# Patient Record
(demographics unavailable — no encounter records)

---

## 2024-10-11 NOTE — LETTER BODY
[FreeTextEntry1] : Janeth Alcazar DO [address] [facility] [address], [place] [address] [phone]  Dear Dr. Alcazar,     REASON FOR VISIT: BPH    This is a 77 year-old gentleman with lower urinary tract symptoms and BPH. Patient is here today for follow-up cystoscopy and UDS. His cystoscopy today demonstrated a prostatic urethra length of 4 cm. Patient has bilobar hypertrophy obstructing the urinary outlet. His UDS confirms evidence of bladder outlet obstruction. Patient reports he has weak uroflow, frequency, and hesitancy despite taking Flomax and Proscar. He denies any hematuria or urinary incontinence. His symptoms are aggravated by hydration. He denies any alleviating factors. He denies any pain. All other review of systems are negative. He has no cancer in his family medical history. He has no previous surgical history. Past medical history, family history and social history were inquired and were noncontributory to current condition. The patient does not use tobacco or drink alcohol. Medications and allergies were reviewed. He has no known allergies to medication.    On examination, the patient is a healthy-appearing gentleman in no acute distress. He is alert and oriented follows commands. He has normal mood and affect. He is normocephalic. Neck is supple. Oral no thrush. Respirations are unlabored. His abdomen is soft and nontender. Bladder is nonpalpable. No CVA tenderness. Neurologically he is grossly intact. No peripheral edema. Skin without gross abnormality. He has normal male external genitalia. Normal meatus. Bilateral testes are descended intrascrotally and normal to palpation. On rectal examination, there is normal sphincter tone. The prostate is clinically benign without focal induration or nodularity.  His cystoscopy today demonstrated a prostatic urethra length of 4 cm. Patient has bilobar hypertrophy obstructing the urinary outlet.   His UDS confirms evidence of bladder outlet obstruction.    ASSESSMENT: BPH    I counseled the patient. His cystoscopy today demonstrated bilobar hypertrophy obstructing the urinary outlet. Urodynamics also demonstrated evidence of bladder outlet obstruction. I discussed the treatment options available to the patient. He will proceed with Greenlight laser vaporization of prostate. I counseled the patient regarding the procedure. The patient will take the necessary preparations for the procedure.  Risks and alternatives were discussed. I answered the patient's questions. The patient will follow-up as directed and will contact me with any questions or concerns. Thank you for the opportunity to participate in the care of Mr. ANTOINE. I will keep you updated on his progress.    Plan: Greenlight laser vaporization of prostate. Follow up as directed.

## 2024-10-11 NOTE — ADDENDUM
[FreeTextEntry1] : Entered by Claude Sanches, acting as scribe for Dr. Alexys Villatoro. The documentation recorded by the scribe accurately reflects the service I personally performed and the decisions made by me.

## 2024-10-11 NOTE — LETTER BODY
[FreeTextEntry1] : Janeth Alcazar DO [address] [facility] [address], [place] [address] [phone]  Dear Dr. Alcazar,     REASON FOR VISIT: BPH    This is a 77 year-old gentleman with lower urinary tract symptoms and BPH. Patient is here today for follow-up cystoscopy and UDS. His cystoscopy today demonstrated a prostatic urethra length of 4 cm. Patient has bilobar hypertrophy obstructing the urinary outlet. His UDS confirms evidence of bladder outlet obstruction. Patient reports he has weak uroflow, frequency, and hesitancy despite taking Flomax and Proscar. He denies any hematuria or urinary incontinence. His symptoms are aggravated by hydration. He denies any alleviating factors. He denies any pain. All other review of systems are negative. He has no cancer in his family medical history. He has no previous surgical history. Past medical history, family history and social history were inquired and were noncontributory to current condition. The patient does not use tobacco or drink alcohol. Medications and allergies were reviewed. He has no known allergies to medication.    On examination, the patient is a healthy-appearing gentleman in no acute distress. He is alert and oriented follows commands. He has normal mood and affect. He is normocephalic. Neck is supple. Oral no thrush. Respirations are unlabored. His abdomen is soft and nontender. Bladder is nonpalpable. No CVA tenderness. Neurologically he is grossly intact. No peripheral edema. Skin without gross abnormality. He has normal male external genitalia. Normal meatus. Bilateral testes are descended intrascrotally and normal to palpation. On rectal examination, there is normal sphincter tone. The prostate is clinically benign without focal induration or nodularity.  His cystoscopy today demonstrated a prostatic urethra length of 4 cm. Patient has bilobar hypertrophy obstructing the urinary outlet.   His UDS confirms evidence of bladder outlet obstruction.    ASSESSMENT: BPH    I counseled the patient. His cystoscopy today demonstrated bilobar hypertrophy obstructing the urinary outlet. Urodynamics also demonstrated evidence of bladder outlet obstruction. I discussed the treatment options available to the patient. He will proceed with Greenlight laser vaporization of prostate. I counseled the patient regarding the procedure. The patient will take the necessary preparations for the procedure.  Risks and alternatives were discussed. I answered the patient's questions. The patient will follow-up as directed and will contact me with any questions or concerns. Thank you for the opportunity to participate in the care of Mr. ANTOINE. I will keep you updated on his progress.    Plan: Greenlight laser vaporization of prostate. Follow up as directed. [] : results reviewed [de-identified] : Stable pancytopenia [de-identified] : unremarkable  [de-identified] : Slightly elevated creatinine that will be monitored, otherwise unremarkable [de-identified] : Sinus bradycardia, first-degree AV block

## 2024-11-08 NOTE — LETTER BODY
[FreeTextEntry1] :   Janeth Alcazar DO [address] [facility] [address], [place] [address] [phone]  Dear Dr. Alcazar,     REASON FOR VISIT: BPH s/p GreenLight laser vaporization of prostate    This is a 77 year-old gentleman with lower urinary tract symptoms and BPH. His cystoscopy in October 2024 demonstrated a prostatic urethra length of 4 cm. Patient has bilobar hypertrophy obstructing the urinary outlet. His urodynamics in October 2024 confirmed evidence of bladder outlet obstruction. Patient is here today for follow-up and voiding trial. Patient is POA status post GreenLight laser vaporization of prostate. Patient passed his voiding trial today.  Patient reports taking Flomax and Proscar regularly with no side effects or difficulties. He denies any hematuria or urinary incontinence. His symptoms are aggravated by hydration. He denies any alleviating factors. He denies any pain. All other review of systems are negative. He has no cancer in his family medical history. He has no previous surgical history. Past medical history, family history and social history were inquired and were noncontributory to current condition. The patient does not use tobacco or drink alcohol. Medications and allergies were reviewed. He has no known allergies to medication.    On examination, the patient is a healthy-appearing gentleman in no acute distress. He is alert and oriented follows commands. He has normal mood and affect. He is normocephalic. Neck is supple. Oral no thrush. Respirations are unlabored. His abdomen is soft and nontender. Bladder is nonpalpable. No CVA tenderness. Neurologically he is grossly intact. No peripheral edema. Skin without gross abnormality.    Patient was given a voiding trial today. The patient's bladder was filled with 300 cc of water. Patient was able to void spontaneously.    ASSESSMENT: BPH s/p GreenLight laser vaporization of prostate    I counseled the patient. Patient is POA status post GreenLight laser vaporization of prostate. He denied any interval difficulties. He passed his voiding trial in the office today. I recommended that he continue to take Flomax and Proscar for his urinary symptoms. He will take the medication regularly as directed. He will also return in 1 month to ensure stability. Risks and alternatives were discussed. I answered the patient's questions. The patient will follow-up as directed and will contact me with any questions or concerns. Thank you for the opportunity to participate in the care of Mr. ANTOINE. I will keep you updated on his progress.    Plan: Continue Flomax and Proscar. Follow up in 1 month.

## 2024-11-08 NOTE — LETTER BODY
[FreeTextEntry1] :   Janeth Alcazar DO [address] [facility] [address], [place] [address] [phone]  Dear Dr. Alcazar,     REASON FOR VISIT: BPH s/p GreenLight laser vaporization of prostate    This is a 77 year-old gentleman with lower urinary tract symptoms and BPH. His cystoscopy in October 2024 demonstrated a prostatic urethra length of 4 cm. Patient has bilobar hypertrophy obstructing the urinary outlet. His urodynamics in October 2024 confirmed evidence of bladder outlet obstruction. Patient is here today for follow-up and voiding trial. Patient is POA status post GreenLight laser vaporization of prostate. Patient passed his voiding trial today.  Patient reports taking Flomax and Proscar regularly with no side effects or difficulties. He denies any hematuria or urinary incontinence. His symptoms are aggravated by hydration. He denies any alleviating factors. He denies any pain. All other review of systems are negative. He has no cancer in his family medical history. He has no previous surgical history. Past medical history, family history and social history were inquired and were noncontributory to current condition. The patient does not use tobacco or drink alcohol. Medications and allergies were reviewed. He has no known allergies to medication.    On examination, the patient is a healthy-appearing gentleman in no acute distress. He is alert and oriented follows commands. He has normal mood and affect. He is normocephalic. Neck is supple. Oral no thrush. Respirations are unlabored. His abdomen is soft and nontender. Bladder is nonpalpable. No CVA tenderness. Neurologically he is grossly intact. No peripheral edema. Skin without gross abnormality.    Patient was given a voiding trial today. The patient's bladder was filled with 300 cc of water. Patient was able to void spontaneously.    ASSESSMENT: BPH s/p GreenLight laser vaporization of prostate    I counseled the patient. Patient is POA status post GreenLight laser vaporization of prostate. He denied any interval difficulties. He passed his voiding trial in the office today. I recommended that he continue to take Flomax and Proscar for his urinary symptoms. He will take the medication regularly as directed. He will also return in 1 month to ensure stability. Risks and alternatives were discussed. I answered the patient's questions. The patient will follow-up as directed and will contact me with any questions or concerns. Thank you for the opportunity to participate in the care of Mr. ANTOINE. I will keep you updated on his progress.    Plan: Continue Flomax and Proscar. Follow up in 1 month.

## 2024-11-23 NOTE — HISTORY OF PRESENT ILLNESS
[FreeTextEntry1] : Follow-up post TURP on 11/6/2024, passed TOV on 11/8/2024, on Flomax and Proscar once daily. Patient complained hematuria since Tuesday, here for follow up. PVR 11ml today  Please refer to URO Consult Note.

## 2024-11-23 NOTE — LETTER BODY
[FreeTextEntry1] : Janeth Alcazar DO [address], [place] [address] [phone]  Dear Dr. Alcazar,     REASON FOR VISIT: BPH s/p GreenLight laser vaporization of prostate. Hematuria    This is a 77 year-old gentleman with hematuria and BPH status post TURP. His cystoscopy in October 2024 demonstrated a prostatic urethra length of 4 cm. Patient has bilobar hypertrophy obstructing the urinary outlet. His urodynamics in October 2024 confirmed evidence of bladder outlet obstruction. Patient passed his voiding trial earlier this month. Patient is here today for follow-up. Patient status post Greenlight laser vaporization of prostate 2 weeks ago. Since he was last seen, patient reports of new hematuria after his surgery. Patient reports taking Flomax and Proscar regularly with no side effects or difficulties. He notes stable symptoms with medical therapy. He denies any hematuria or urinary incontinence. His symptoms are aggravated by hydration. He denies any alleviating factors. He denies any pain. All other review of systems are negative. He has no cancer in his family medical history. He has no previous surgical history. Past medical history, family history and social history were inquired and were noncontributory to current condition. The patient does not use tobacco or drink alcohol. Medications and allergies were reviewed. He has no known allergies to medication.    On examination, the patient is a healthy-appearing gentleman in no acute distress. He is alert and oriented follows commands. He has normal mood and affect. He is normocephalic. Neck is supple. Oral no thrush. Respirations are unlabored. His abdomen is soft and nontender. Bladder is nonpalpable. No CVA tenderness. Neurologically he is grossly intact. No peripheral edema. Skin without gross abnormality.   Post-void residual on bladder scan today was 11 cc.  His urine contained trace hematuria.  ASSESSMENT: BPH s/p GreenLight laser vaporization of prostate. Hematuria.    I counseled the patient. In terms of his hematuria, patient expressed concern for hematuria. His PVR today was 11 cc. There was trace hematuria. I recommended the patient obtain urinalysis, urine culture, and urine cytology to evaluate for infections and high grade urothelial carcinoma. In terms of his BPH, I recommended he continue to take his prostate medications. Risks and alternatives were discussed. I answered the patient's questions. The patient will follow-up as directed and will contact me with any questions or concerns. Thank you for the opportunity to participate in the care of Mr. ANTOINE. I will keep you updated on his progress.    Plan: Continue Flomax and Proscar. Urinalysis. Urine culture. Urine cytology. PVR. Follow up in 6 months.  I spent 30-minutes time today on all issues related to this patient on today date of service including non face to face time.

## 2024-11-23 NOTE — LETTER BODY
[FreeTextEntry1] : Janeth Alcazar DO [address], [place] [address] [phone]  Dear Dr. Alcazar,     REASON FOR VISIT: BPH s/p GreenLight laser vaporization of prostate. Hematuria    This is a 77 year-old gentleman with hematuria and BPH status post TURP. His cystoscopy in October 2024 demonstrated a prostatic urethra length of 4 cm. Patient has bilobar hypertrophy obstructing the urinary outlet. His urodynamics in October 2024 confirmed evidence of bladder outlet obstruction. Patient passed his voiding trial earlier this month. Patient is here today for follow-up. Patient status post Greenlight laser vaporization of prostate 2 weeks ago. Since he was last seen, patient reports of new hematuria after his surgery. Patient reports taking Flomax and Proscar regularly with no side effects or difficulties. He notes stable symptoms with medical therapy. He denies any hematuria or urinary incontinence. His symptoms are aggravated by hydration. He denies any alleviating factors. He denies any pain. All other review of systems are negative. He has no cancer in his family medical history. He has no previous surgical history. Past medical history, family history and social history were inquired and were noncontributory to current condition. The patient does not use tobacco or drink alcohol. Medications and allergies were reviewed. He has no known allergies to medication.    On examination, the patient is a healthy-appearing gentleman in no acute distress. He is alert and oriented follows commands. He has normal mood and affect. He is normocephalic. Neck is supple. Oral no thrush. Respirations are unlabored. His abdomen is soft and nontender. Bladder is nonpalpable. No CVA tenderness. Neurologically he is grossly intact. No peripheral edema. Skin without gross abnormality.   Post-void residual on bladder scan today was 11 cc.  His urine contained trace hematuria.  ASSESSMENT: BPH s/p GreenLight laser vaporization of prostate. Hematuria.    I counseled the patient. In terms of his hematuria, patient expressed concern for hematuria. His PVR today was 11 cc. There was trace hematuria. I recommended the patient obtain urinalysis, urine culture, and urine cytology to evaluate for infections and high grade urothelial carcinoma. In terms of his BPH, I recommended he continue to take his prostate medications. Risks and alternatives were discussed. I answered the patient's questions. The patient will follow-up as directed and will contact me with any questions or concerns. Thank you for the opportunity to participate in the care of Mr. ANTOINE. I will keep you updated on his progress.    Plan: Continue Flomax and Proscar. Urinalysis. Urine culture. Urine cytology. PVR. Follow up in 6 months.  I spent 30-minutes time today on all issues related to this patient on today date of service including non face to face time.

## 2024-12-13 NOTE — ADDENDUM
[FreeTextEntry1] : Entered by Claude Sanches, acting as scribe for Dr. Aelxys Villatoro. The documentation recorded by the scribe accurately reflects the service I personally performed and the decisions made by me.

## 2024-12-13 NOTE — LETTER BODY
[FreeTextEntry1] : Janeth Aclazar DO [address], [place] [address] [phone]  Dear Dr. Alcazar,     REASON FOR VISIT: BPH s/p GreenLight laser vaporization of prostate. Hematuria    This is a 77 year-old gentleman with hematuria and BPH status post TURP. His cystoscopy in October 2024 demonstrated a prostatic urethra length of 4 cm. Patient has bilobar hypertrophy obstructing the urinary outlet. His urodynamics in October 2024 confirmed evidence of bladder outlet obstruction. Patient passed his voiding trial earlier this month. Patient is here today for follow-up. Patient status post Greenlight laser vaporization of prostate 1 month ago. Since he was last seen, patient reports taking Flomax and Proscar regularly with no side effects or difficulties. He notes stable symptoms with medical therapy. He denies any hematuria or urinary incontinence. His symptoms are aggravated by hydration. He denies any alleviating factors. He denies any pain. All other review of systems are negative. He has no cancer in his family medical history. He has no previous surgical history. Past medical history, family history and social history were inquired and were noncontributory to current condition. The patient does not use tobacco or drink alcohol. Medications and allergies were reviewed. He has no known allergies to medication.    On examination, the patient is a healthy-appearing gentleman in no acute distress. He is alert and oriented follows commands. He has normal mood and affect. He is normocephalic. Neck is supple. Oral no thrush. Respirations are unlabored. His abdomen is soft and nontender. Bladder is nonpalpable. No CVA tenderness. Neurologically he is grossly intact. No peripheral edema. Skin without gross abnormality.   Post-void residual on bladder scan today was 0 cc.   ASSESSMENT: BPH s/p GreenLight laser vaporization of prostate. Hematuria, resolved.    I counseled the patient. In terms of his BPH, the patient underwent GreenLight laser vaporization of prostate 1 month ago. He reports doing better after the surgery a. His PVR today was 0 cc. I reassured the patient. I recommended the patient follow-up in 6 months to ensure stability. I encouraged the patient to continue taking his prostate medications regularly as directed. Risks and alternatives were discussed. I answered the patient's questions. The patient will follow-up as directed and will contact me with any questions or concerns. Thank you for the opportunity to participate in the care of Mr. ANTOINE. I will keep you updated on his progress.    Plan: Discontinue Flomax medications. PVR. Follow up in 6 months.

## 2025-03-18 NOTE — LETTER BODY
[FreeTextEntry1] : Janeth Alcazar DO [address], [place] [address] [phone]  Dear Dr. Alcazar,     REASON FOR VISIT: BPH s/p GreenLight laser vaporization of prostate. Hematuria    This is a 77 year-old gentleman with hematuria and BPH status post TURP. His cystoscopy in October 2024 demonstrated a prostatic urethra length of 4 cm. Patient has bilobar hypertrophy obstructing the urinary outlet. His urodynamics in October 2024 confirmed evidence of bladder outlet obstruction. Patient status post Greenlight laser vaporization of prostate in November 2024. Patient passed his voiding trial last year. Patient is here today for follow-up. Since he was last seen, patient reports discontinuing his prostate medications. He notes stable symptoms and strong uroflow without medical therapy. He denies any hematuria or urinary incontinence. His symptoms are aggravated by hydration. He denies any alleviating factors. He denies any pain. All other review of systems are negative. He has no cancer in his family medical history. He has no previous surgical history. Past medical history, family history and social history were inquired and were noncontributory to current condition. The patient does not use tobacco or drink alcohol. Medications and allergies were reviewed. He has no known allergies to medication.   On examination, the patient is a healthy-appearing gentleman in no acute distress. He is alert and oriented follows commands. He has normal mood and affect. He is normocephalic. Neck is supple. Oral no thrush. Respirations are unlabored. His abdomen is soft and nontender. Bladder is nonpalpable. No CVA tenderness. Neurologically he is grossly intact. No peripheral edema. Skin without gross abnormality.   Post-void residual on bladder scan today was 0 cc.   ASSESSMENT: BPH s/p GreenLight laser vaporization of prostate. Hematuria, resolved.    I counseled the patient. In terms of his BPH, the patient underwent GreenLight laser vaporization of prostate last year. Patient discontinued his prescription of Flomax and Proscar since his last visit. He reports stable urinary symptoms and strong uroflow without medical therapy. His PVR today was 0 cc which is minimal. I reassured the patient. I recommended the patient repeat PSA and BMP to ensure stability. He will follow-up in 1 year to ensure stability. Risks and alternatives were discussed. I answered the patient's questions. The patient will follow-up as directed and will contact me with any questions or concerns. Thank you for the opportunity to participate in the care of Mr. ANTOINE. I will keep you updated on his progress.    Plan: PSA. BMP. PVR. Follow up in 1 year.  I spent 30-minutes time today on all issues related to this patient on today date of service including non face to face time.

## 2025-03-18 NOTE — HISTORY OF PRESENT ILLNESS
[FreeTextEntry1] : Follow-up for BPH, s/p TURP in Nov 2024, not on any prostate medication currently, reports doing well since surgery, strong urine flow. PVR 14ml today (11ml in Nov 2024) PSA 2.8 in Aug 2024   Please refer to URO Consult Note.